# Patient Record
(demographics unavailable — no encounter records)

---

## 2025-03-21 NOTE — ASSESSMENT
[FreeTextEntry1] : I suspect patient has had a anal fissure that has now resolved.  I had a detailed discussion with the patient regarding optimization of bowel movements with increasing daily fiber and water intake. Both synthetic and dietary fiber recommendations were reviewed. I had strongly counseled the patient regarding the need for increasing water to help improve  bowel function.  I discussed with the patient that improving  bowel function will alleviate   symptoms. Recommend 2-week course of lubricant suppositories.  Advised return in 4-6 weeks if symptoms are persistent.

## 2025-03-21 NOTE — HISTORY OF PRESENT ILLNESS
[FreeTextEntry1] : 40 yo M presents for initial evaluation.  Referred by: family Referral Reason: rectal pain  PMH: denies PSH: denies FH: denies CRC/ IBD Medications: none Allergies: NKDA Social history: denies Last Colonoscopy: denies  Patient presents for initial evaluation. 2 weeks ago, he had 1 episode of rectal bleeding, blood filling toilet bowl, followed by rectal pain. He has not had an episode of bleeding since, but has had persistent rectal pain.  No relief with HC 2.5%< some relief with preparation H which he has used over the past 4 days. Denies prolapsing tissue per rectum.  BH: 1x/ day, soft formed stools, no fiber supplements or stool softeners  No AC/ NSAIDS use

## 2025-03-21 NOTE — PHYSICAL EXAM
New GYN Exam    CC- Here for abnormal bleeding.    Caitlin Rizvi is a 14 y.o. female new patient who presents for abnormal bleeding.  She underwent menarche at age 11.  She usually skips cycles and missed a cycle in February but then had 2 cycles in March.  She bled from  to May 2 of this year.  Her bleeding was heavy with clots and she had to wear double protection.  She had a hemoglobin done on 2024 that was 9.2 and she is now on daily iron.  She does report easy bruising but no nosebleeds.  She has not ever had surgery before.  She denies any family history of bleeding disorders.  She was interviewed both with her mom and separately.  She was sexually active once at age 13.  They did not use any protection.  Both she and her mother say that she is not sexually active at present, however, we did discuss at length that once people start sexual activity they should be prepared for pregnancy prevention.  Her ultrasound today shows a 5.7 cm AV uterus, her EL= 0.3 cm.  There is a normal L ovary.  The R ovary has simple cyst measuring 3.7 x 2.5cm  No comparable data.  We did discuss that she will need short-term follow-up for repeat of this ultrasound in 2 months.  We also discussed that she will need repeat labs in 2 months to make sure her hemoglobin is responding.  We did discuss all possible treatment options and she is interested in birth control pills.  We will start her on OCPs now and will have her skip her placebo for the first month to try to help regulate her bleeding.  We did discuss the importance of condoms both for disease prevention as well as pregnancy prevention.       OB History          0    Para   0    Term   0       0    AB   0    Living   0         SAB   0    IAB   0    Ectopic   0    Molar   0    Multiple   0    Live Births   0          Obstetric Comments   No plans               Menarche: 11  Current contraception: none  History of abnormal Pap smear:  Never had  1  History of abnormal mammogram:  None  Family history of uterine, colon or ovarian cancer: no  Family history of breast cancer: no  H/o STDs: non3  Last pap:never  Gardasil:refuses  ALDAIR: none    Health Maintenance   Topic Date Due    IPV VACCINES (1 of 3 - 4-dose series) Never done    HEPATITIS A VACCINES (1 of 2 - 2-dose series) Never done    MMR VACCINES (1 of 2 - Standard series) Never done    HPV VACCINES (1 - 2-dose series) Never done    VARICELLA VACCINES (1 of 2 - 13+ 2-dose series) Never done    COVID-19 Vaccine (3 - 2023-24 season) 09/01/2023    ANNUAL PHYSICAL  Never done    INFLUENZA VACCINE  08/01/2024    MENINGOCOCCAL VACCINE (2 - 2-dose series) 07/08/2025    DTAP/TDAP/TD VACCINES (7 - Td or Tdap) 07/10/2030    HEPATITIS B VACCINES  Completed    Pneumococcal Vaccine 0-64  Aged Out       Past Medical History:   Diagnosis Date    Anemia        Past Surgical History:   Procedure Laterality Date    NO PAST SURGERIES           Current Outpatient Medications:     Ferrous Sulfate (IRON PO), Take  by mouth., Disp: , Rfl:     multivitamin (MULTIVITAMIN PO), Take  by mouth Daily., Disp: , Rfl:     Probiotic Product (PROBIOTIC PO), Take  by mouth., Disp: , Rfl:     norethindrone-ethinyl estradiol-iron (Junel FE 1.5/30) 1.5-30 MG-MCG tablet, Take 1 tablet by mouth Daily., Disp: 84 tablet, Rfl: 3    No Known Allergies    Social History     Tobacco Use    Smoking status: Never   Vaping Use    Vaping status: Never Used   Substance Use Topics    Alcohol use: Never    Drug use: Never       Family History   Problem Relation Age of Onset    Breast cancer Neg Hx     Ovarian cancer Neg Hx     Uterine cancer Neg Hx     Colon cancer Neg Hx     Deep vein thrombosis Neg Hx     Pulmonary embolism Neg Hx     Bleeding Disorder Neg Hx     Birth defects Neg Hx        Review of Systems   Constitutional:  Positive for activity change and fatigue.   Genitourinary:  Positive for menstrual problem and vaginal bleeding.   All other  "systems reviewed and are negative.      /62   Ht 152.4 cm (60\")   Wt 48.1 kg (106 lb)   LMP 04/04/2024   BMI 20.70 kg/m²     Physical Exam  Vitals and nursing note reviewed. Exam conducted with a chaperone present.   Constitutional:       Appearance: Normal appearance. She is well-developed.   HENT:      Head: Normocephalic and atraumatic.   Eyes:      General: No scleral icterus.     Conjunctiva/sclera: Conjunctivae normal.   Neck:      Thyroid: No thyromegaly.   Cardiovascular:      Rate and Rhythm: Normal rate and regular rhythm.   Pulmonary:      Effort: Pulmonary effort is normal.      Breath sounds: Normal breath sounds.   Abdominal:      General: There is no distension.      Palpations: Abdomen is soft. There is no mass.      Tenderness: There is no abdominal tenderness. There is no guarding or rebound.      Hernia: No hernia is present.   Musculoskeletal:      Cervical back: Neck supple.   Skin:     General: Skin is warm and dry.   Neurological:      Mental Status: She is alert and oriented to person, place, and time.   Psychiatric:         Behavior: Behavior normal.         Thought Content: Thought content normal.         Judgment: Judgment normal.               Assessment/Plan  1) AUB-check TSH &  von Willebrand panel  2) Anemia- check CBC and ferritin, continue daily iron  3) CS- Discussed with patient at length risk, benefits and alternatives to all contraceptive options, including oral contraceptive pills (both combination and progesterone only), vaginal rings, patches, Dep Provera, condoms, diaphragm, cervical caps, as well as long active but reversible forms such as Nexplanon and all IUD’s.  Differences in birth control and cycle control between methods were outlined along with correct usage for each method.  After discussion, the patient is most interest in OCPS. ERX Junel 1.5/30 mg cont use. Discussed with patient correct usage of oral contraceptive pills/patches/rings and what to do for a " [Excoriation] : no perianal excoriation [Skin Tags] : there were no residual hemorrhoidal skin tags seen missed dose.  Patient reminded that condoms are the only form of contraceptive that can also prevent STDs and so use is encouraged with every act of coitus.  We reviewed ACHES warning signs (abdominal pain, chest pain, headache, eye vision changes or severe leg pain and or swelling).  Patient is encouraged to call for any questions or concerns.    4) Enc condoms  5) Check C/G/T  6) Discussed with patient risks, benefits and alternatives to the Gardasil vaccination.  The vaccine is administered in the arm in a series of 3 shots at 02 in 6 months.  It provides a 70 to 90% reduction in HPV related diseases such as abnormal Pap smears, genital warts and cervical cancer.  The vaccine was originally approved for ages 9-26, but is recently been expanded to the age of 45.  The most common side effects are pain at the injection site and fainting.  Any and all adverse side effects are tracked by the FDA and are available on their website for review.  After consideration, the patient plans declines vaccine.  7) R ovarian cyst- torsion warnings given. RTO in 2 months repeat TVUS and f/u VB.   8) EPIC LOS calculator used to determine coding level.              Diagnoses and all orders for this visit:    1. Screening for genitourinary condition (Primary)  -     POC Urinalysis Dipstick  -     POC Pregnancy, Urine    2. Abnormal uterine bleeding (AUB)  -     CBC & Differential  -     TSH  -     Ferritin  -     Chlamydia trachomatis, Neisseria gonorrhoeae, Trichomonas vaginalis, PCR - Urine, Urine, Random Void  -     ABO / Rh  -     aPTT  -     Factor 8 Activity  -     Von Willebrand Antigen  -     Von Willebrand Factor Activity    3. Other iron deficiency anemia    4. Screen for STD (sexually transmitted disease)    5. Cyst of ovary, unspecified laterality    6. Encounter for initial prescription of contraceptive pills    Other orders  -     norethindrone-ethinyl estradiol-iron (Junel FE 1.5/30) 1.5-30 MG-MCG tablet; Take 1 tablet  [Normal] : was normal by mouth Daily.  Dispense: 84 tablet; Refill: 3          Pura Churchill MD  05/09/2024  14:49 EDT       [None] : there was no rectal mass  [FreeTextEntry1] : Medical assistant was present for the entire exam.  Anoscopy was performed for evaluation of the patients rectal bleeding  history . The risks, benefits and alternatives were reviewed.  A lighted anoscope was passed into the anal canal and the entire anal mucosal surface was inspected..   The findings revealed moderate internal hemorrhoids. No masses or lesions were identified.

## 2025-04-07 NOTE — ASSESSMENT
[FreeTextEntry1] : I had an extensive discussion with the patient (30 minutes) regarding the diagnosis of an anal fissure. The etiology is suspected to be related to a hypertonic sphincter as well as secondary direct anal trauma. The medical and surgical management options have been reviewed in detail including lubricant suppository therapy, stool softeners, fiber agents, and surgical anal dilatation. The risks, benefits and alternatives including bleeding infection, nonhealing wounds, and permanent leakage, seepage and incontinence have been detailed. All questions have been reviewed and answered. Appropriate literature has been shared with the patient.  Recommend trial of topical nifedipine with lidocaine.  Follow-up 2 weeks if symptoms or not improving

## 2025-04-07 NOTE — PHYSICAL EXAM
[Anterior] : anteriorly [Excoriation] : no perianal excoriation [Skin Tags] : there were no residual hemorrhoidal skin tags seen [Normal] : was normal [None] : there was no rectal mass  [de-identified] : Minimal anterior anal fissure

## 2025-04-07 NOTE — HISTORY OF PRESENT ILLNESS
[FreeTextEntry1] : 42 y/o M presents for follow up   PMH: denies PSH: denies FH: denies CRC/ IBD Medications: none Allergies: NKDA Social history: denies Last Colonoscopy: denies  Patient initially seen 03/21/25 for c/o rectal pain and 1x episode of rectal bleeding Exam noted External hemorrhoid. Moderate internal hemorrhoids. Recommended supportive treatment with lubricant suppositories and improving BMs.   Patient presents today in follow up  Overall doing okay. States used lubricant suppositories for 1 week States he has not seen any blood but feels some anal discomfort still Moves bowels daily 3x a day, patient states stool consistency varies at times.

## 2025-04-07 NOTE — REASON FOR VISIT
[facility]  Urology  Progress Note    Patient Name: Nitihn Wagner  MRN: [id number]  Admission Date: 11/15/2022  Hospital Length of Stay: 14 days  Code Status: Full Code   Attending Provider: Abelino Pollock MD   Primary Care Physician: Tushar Drew MD    Subjective:     HPI:  69M h/o DM2, CAD, HTN, HLD presents with scrotal pain and swelling. Started about 4 weeks ago and has been progressively worsening despite a couple courses of antibiotics (unsure which ones). He reports fevers and chills at home. He also reports nausea, decreased PO intake, multiple syncopal episodes, and stool incontinence. He is on continuous blood glucose monitoring and states blood sugars have been in 190s today. He was seen yesterday in urgent care and diagnosed with scrotal abscess with possible kami's; he referred to the ER but waited until today to come in.    He was hypotensive and tachycardic on arrival in the ED. Sepsis protocol was started. Labs and imaging are currently pending.         Interval History: AFVSS. Continued SVT managed by cards. Wound stable. Reports some worse pain today.    Review of Systems  Objective:     Temp:  [98 °F (36.7 °C)-98.7 °F (37.1 °C)] 98.4 °F (36.9 °C)  Pulse:  [] 88  Resp:  [16-20] 16  SpO2:  [88 %-96 %] 88 %  BP: (105-137)/(64-75) 115/68     Body mass index is 35.08 kg/m².           Drains       None                   Physical Exam  Vitals reviewed.   HENT:      Head: Normocephalic and atraumatic.   Abdominal:      General: Abdomen is flat.      Palpations: Abdomen is soft.   Genitourinary:     Penis: Normal.       Comments: Medial edge of incision with granulation tissue- no necrotic eschars  Testicle appropriately tender  No odor  No additional crepitus or purulent discharge  Musculoskeletal:         General: No swelling or deformity.      Cervical back: Neck supple.   Skin:     General: Skin is warm.      Findings: No bruising.   Psychiatric:         Mood and  Affect: Mood normal.         Judgment: Judgment normal.             Significant Labs:    BMP:  Recent Labs   Lab 11/27/22  0352 11/28/22  0440 11/29/22  0405    137 133*   K 4.0 4.4 4.1   CL 96 96 95   CO2 32* 34* 32*   BUN 7* 9 10   CREATININE 0.8 1.0 1.0   CALCIUM 8.9 9.3 8.6*       CBC:   Recent Labs   Lab 11/27/22  0352 11/28/22  0440 11/29/22  0405   WBC 9.17 10.75 9.04   HGB 14.5 15.4 14.4   HCT 43.8 46.9 44.8    376 333       Urine Studies:   Recent Labs   Lab 11/23/22  2314   COLORU Yellow   APPEARANCEUA Clear   PHUR 7.0   SPECGRAV 1.010   PROTEINUA Negative   GLUCUA Trace*   KETONESU 1+*   BILIRUBINUA Negative   OCCULTUA Negative   NITRITE Negative   UROBILINOGEN Negative   LEUKOCYTESUR Negative       Significant Imaging:  All pertinent imaging results/findings from the past 24 hours have been reviewed.        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  Clinically stable    - Admitted to hospital medicine, appreciate assistance  - Debrided 11/15 and 11/17 in OR. Medial aspect of wound debrided at bedside 11/22.  - Voiding spontaneously  - Diet, insulin and pain control per primary  - Cardiology consulted for SVT  - Recommend PT for weakness     - Continue antibiotics per infectious disease recs. On cefepime/flagyl.  - Anaerobic culture with Anaerococcus murdochii and Prevotella bucchalis.  - Dressing changes BID/ RN staff. Wound care following.  - Rest of care per primary        VTE Risk Mitigation (From admission, onward)         Ordered     IP VTE HIGH RISK PATIENT  Once         11/15/22 1948     Reason for No Pharmacological VTE Prophylaxis  Once        Question:  Reasons:  Answer:  Risk of Bleeding    11/15/22 1948     Place sequential compression device  Until discontinued         11/15/22 1936                Ryley Celaya MD  Urology  Scientologist - Med Surg ([place])   [Follow-Up: _____] : a [unfilled] follow-up visit